# Patient Record
Sex: FEMALE | Race: WHITE | Employment: OTHER | URBAN - METROPOLITAN AREA
[De-identification: names, ages, dates, MRNs, and addresses within clinical notes are randomized per-mention and may not be internally consistent; named-entity substitution may affect disease eponyms.]

---

## 2019-08-27 ENCOUNTER — OFFICE VISIT (OUTPATIENT)
Dept: FAMILY MEDICINE CLINIC | Facility: CLINIC | Age: 84
End: 2019-08-27
Payer: MEDICARE

## 2019-08-27 VITALS
HEART RATE: 74 BPM | RESPIRATION RATE: 14 BRPM | WEIGHT: 130 LBS | SYSTOLIC BLOOD PRESSURE: 128 MMHG | DIASTOLIC BLOOD PRESSURE: 60 MMHG | HEIGHT: 65 IN | TEMPERATURE: 98 F | BODY MASS INDEX: 21.66 KG/M2

## 2019-08-27 DIAGNOSIS — I10 ESSENTIAL HYPERTENSION: Primary | ICD-10-CM

## 2019-08-27 DIAGNOSIS — M47.899 OTHER OSTEOARTHRITIS OF SPINE, UNSPECIFIED SPINAL REGION: ICD-10-CM

## 2019-08-27 PROBLEM — G62.9 NEUROPATHY: Status: ACTIVE | Noted: 2019-08-27

## 2019-08-27 PROBLEM — M19.90 ARTHRITIS: Status: ACTIVE | Noted: 2019-08-27

## 2019-08-27 PROBLEM — E78.2 MIXED HYPERLIPIDEMIA: Status: ACTIVE | Noted: 2019-08-27

## 2019-08-27 PROCEDURE — 99203 OFFICE O/P NEW LOW 30 MIN: CPT | Performed by: FAMILY MEDICINE

## 2019-08-27 RX ORDER — CYCLOBENZAPRINE HCL 5 MG
TABLET ORAL
Refills: 1 | COMMUNITY
Start: 2019-05-22 | End: 2019-08-27

## 2019-08-27 RX ORDER — HYDROCODONE BITARTRATE AND ACETAMINOPHEN 7.5; 325 MG/1; MG/1
TABLET ORAL
Refills: 0 | COMMUNITY
Start: 2019-08-02 | End: 2020-02-18

## 2019-08-27 RX ORDER — DOCUSATE SODIUM 100 MG/1
100 CAPSULE, LIQUID FILLED ORAL 2 TIMES DAILY PRN
COMMUNITY
End: 2019-09-23

## 2019-08-27 RX ORDER — SIMVASTATIN 40 MG
TABLET ORAL
Refills: 0 | COMMUNITY
Start: 2019-06-11 | End: 2019-09-23

## 2019-08-27 RX ORDER — LOSARTAN POTASSIUM 100 MG/1
TABLET ORAL
Refills: 0 | COMMUNITY
Start: 2019-06-09 | End: 2020-01-20

## 2019-08-27 RX ORDER — HYDROCODONE BITARTRATE AND ACETAMINOPHEN 7.5; 325 MG/1; MG/1
1 TABLET ORAL EVERY 8 HOURS PRN
Qty: 60 TABLET | Refills: 0 | Status: SHIPPED | OUTPATIENT
Start: 2019-08-27 | End: 2019-09-26

## 2019-08-27 RX ORDER — BUPRENORPHINE HCL 8 MG/1
1 TABLET SUBLINGUAL DAILY
COMMUNITY

## 2019-08-27 RX ORDER — HYDROCODONE BITARTRATE AND ACETAMINOPHEN 7.5; 325 MG/1; MG/1
1 TABLET ORAL EVERY 8 HOURS PRN
Qty: 60 TABLET | Refills: 0 | Status: SHIPPED | OUTPATIENT
Start: 2019-10-28 | End: 2019-11-27

## 2019-08-27 RX ORDER — HYDROCODONE BITARTRATE AND ACETAMINOPHEN 7.5; 325 MG/1; MG/1
1 TABLET ORAL EVERY 8 HOURS PRN
Qty: 60 TABLET | Refills: 0 | Status: SHIPPED | OUTPATIENT
Start: 2019-09-27 | End: 2019-10-27

## 2019-08-27 RX ORDER — GABAPENTIN 300 MG/1
1 CAPSULE ORAL 3 TIMES DAILY
COMMUNITY
Start: 2019-06-12 | End: 2020-01-20

## 2019-08-27 RX ORDER — CYCLOBENZAPRINE HCL 5 MG
TABLET ORAL
Qty: 60 TABLET | Refills: 2 | Status: SHIPPED | OUTPATIENT
Start: 2019-08-27 | End: 2020-01-17

## 2019-08-27 NOTE — H&P
[address]    History and Physical    Phuc Rivera Patient Status:  No patient class for patient encounter    1927 MRN [id number]   Location [facility] , [address] [place] [address] Attending No att.  prov Negative for fatigue and fever. HENT: Negative for sore throat and trouble swallowing. Respiratory: Negative for cough and shortness of breath. Cardiovascular: Negative for chest pain, palpitations and leg swelling.    Gastrointestinal: Negative for vIy Vazquez MD  8/27/2019

## 2019-08-27 NOTE — PATIENT INSTRUCTIONS
Thank you for choosing Saloni Ruff MD at [place] [address]  To Do: Vashti Rodriguez  1. Please take meds as directed. [facility] is located in [address]. Monday, Tuesday & Friday – 8 a.m. to 4 p.m. Wednesday, Thursday – 7 a.m. to 3 p.m. those potential risks and we strive to make you healthier and to improve your quality of life.     Referrals, and Radiology Information:    If your insurance requires a referral to a specialist, please allow 5 business days to process your referral request.

## 2019-09-19 NOTE — TELEPHONE ENCOUNTER
simvastatin 40 MG Oral Tab  docusate sodium 100 MG Oral Cap    Authorized Provider: External    Pt established care with PCP on 8/27/2019    [address], [place] - [id number] AT [address] [place] [address], [phone]

## 2019-09-23 RX ORDER — DOCUSATE SODIUM 100 MG/1
100 CAPSULE, LIQUID FILLED ORAL 2 TIMES DAILY PRN
Qty: 180 CAPSULE | Refills: 1 | Status: SHIPPED | OUTPATIENT
Start: 2019-09-23 | End: 2021-02-22

## 2019-09-23 RX ORDER — SIMVASTATIN 40 MG
TABLET ORAL
Qty: 30 TABLET | Refills: 0 | Status: SHIPPED | OUTPATIENT
Start: 2019-09-23 | End: 2019-09-23

## 2019-09-23 NOTE — TELEPHONE ENCOUNTER
Requested Medications      simvastatin 40 MG Oral Tab         Sig: TK 1 T PO QPM    Disp:  30 tablet    Refills:  0    Start: 9/23/2019    Class: Normal    Non-formulary    Last ordered: 3 weeks ago by Reported External/Patient     Cholesterol Medication P

## 2019-09-24 RX ORDER — DOCUSATE SODIUM 100 MG/1
CAPSULE, LIQUID FILLED ORAL
Qty: 180 CAPSULE | Refills: 0 | OUTPATIENT
Start: 2019-09-24

## 2019-09-24 RX ORDER — SIMVASTATIN 40 MG
TABLET ORAL
Qty: 90 TABLET | Refills: 0 | OUTPATIENT
Start: 2019-09-24

## 2019-09-24 RX ORDER — SIMVASTATIN 40 MG
TABLET ORAL
Qty: 90 TABLET | Refills: 0 | Status: SHIPPED | OUTPATIENT
Start: 2019-09-24 | End: 2019-12-23

## 2019-11-08 ENCOUNTER — TELEPHONE (OUTPATIENT)
Dept: FAMILY MEDICINE CLINIC | Facility: CLINIC | Age: 84
End: 2019-11-08

## 2019-11-08 NOTE — TELEPHONE ENCOUNTER
Pt will need refill on the [place]. If can be escribed, please send to the [place]. If not, patient's nephew will come into the office on Wednesday or Thursday to  the prescriptions. Please give for 3 months if possible.  Nephew states that he has Po

## 2019-11-08 NOTE — TELEPHONE ENCOUNTER
Spoke to pt's nephew per HIPAA consent, states that he just filled pt's October script yesterday for Derotha Salaam but will need the next 3 months of refills. Last OV 8/27/19    Pt's nephew states no rush, this can be done on Monday.  Either sent electronically

## 2019-11-09 RX ORDER — HYDROCODONE BITARTRATE AND ACETAMINOPHEN 7.5; 325 MG/1; MG/1
1 TABLET ORAL EVERY 8 HOURS PRN
Qty: 60 TABLET | Refills: 0 | Status: SHIPPED | OUTPATIENT
Start: 2020-01-06 | End: 2020-02-05

## 2019-11-09 RX ORDER — HYDROCODONE BITARTRATE AND ACETAMINOPHEN 7.5; 325 MG/1; MG/1
1 TABLET ORAL EVERY 8 HOURS PRN
Qty: 60 TABLET | Refills: 0 | Status: SHIPPED | OUTPATIENT
Start: 2020-02-05 | End: 2020-02-24

## 2019-11-09 RX ORDER — HYDROCODONE BITARTRATE AND ACETAMINOPHEN 7.5; 325 MG/1; MG/1
1 TABLET ORAL EVERY 8 HOURS PRN
Qty: 60 TABLET | Refills: 0 | Status: SHIPPED | OUTPATIENT
Start: 2019-12-07 | End: 2020-01-06

## 2019-11-13 NOTE — TELEPHONE ENCOUNTER
Patient's POA states he will be here this afternoon to  paper RX's for the next 3 months. Please advise.

## 2019-11-13 NOTE — TELEPHONE ENCOUNTER
Spoke with Chana Arora, advised that [employer] were sent electronically. Chana Arora verbalized understanding and agreement.

## 2019-12-23 RX ORDER — SIMVASTATIN 40 MG
TABLET ORAL
Qty: 90 TABLET | Refills: 0 | Status: SHIPPED | OUTPATIENT
Start: 2019-12-23 | End: 2020-01-20

## 2019-12-23 NOTE — TELEPHONE ENCOUNTER
Cholesterol Medication Protocol Kofyio42/20 10:53 AM   ALT < 80    ALT resulted within past year    Lipid panel within past 12 months    Appointment within past 12 or next 3 months     Requesting SIMVASTATIN 40 MG   LOV: 8/27/19  RTC: 6 months  Last Releva

## 2020-01-17 ENCOUNTER — TELEPHONE (OUTPATIENT)
Dept: FAMILY MEDICINE CLINIC | Facility: CLINIC | Age: 85
End: 2020-01-17

## 2020-01-17 RX ORDER — CYCLOBENZAPRINE HCL 5 MG
TABLET ORAL
Qty: 60 TABLET | Refills: 2 | Status: SHIPPED | OUTPATIENT
Start: 2020-01-17 | End: 2020-05-28

## 2020-01-17 NOTE — TELEPHONE ENCOUNTER
Requested Prescriptions     Pending Prescriptions Disp Refills   • cyclobenzaprine 5 MG Oral Tab [Pharmacy Med Name: CYCLOBENZAPRINE 5MG TABLETS] 60 tablet 2     Sig: TAKE 1 TABLET BY MOUTH ONCE TO TWICE DAILY     Non protocol medication    LOV: 8/27/2019

## 2020-01-17 NOTE — TELEPHONE ENCOUNTER
Patient requesting refill on Hydrocodone 7.5, please send refill to Loni Valadez in [facility].

## 2020-01-17 NOTE — TELEPHONE ENCOUNTER
Pt requesting refill on Norco 7.5/325mg    Last OV 8/27/19    3 months refill on 11/8/19    Per [place] , last dispensed 12/10/19 #60    Called [facility] to confirm whether or not pt has refills on file.  Spoke with pharmacy, confirmed pt has 2 scripts on iris

## 2020-01-20 ENCOUNTER — TELEPHONE (OUTPATIENT)
Dept: FAMILY MEDICINE CLINIC | Facility: CLINIC | Age: 85
End: 2020-01-20

## 2020-01-20 RX ORDER — SIMVASTATIN 40 MG
40 TABLET ORAL NIGHTLY
Qty: 90 TABLET | Refills: 0 | Status: SHIPPED | OUTPATIENT
Start: 2020-01-20 | End: 2020-03-30

## 2020-01-20 RX ORDER — LOSARTAN POTASSIUM 100 MG/1
TABLET ORAL
Qty: 90 TABLET | Refills: 0 | Status: SHIPPED | OUTPATIENT
Start: 2020-01-20 | End: 2020-04-21

## 2020-01-20 RX ORDER — GABAPENTIN 300 MG/1
300 CAPSULE ORAL 3 TIMES DAILY
Qty: 270 CAPSULE | Refills: 0 | Status: SHIPPED | OUTPATIENT
Start: 2020-01-20 | End: 2020-02-18

## 2020-01-20 NOTE — TELEPHONE ENCOUNTER
Received a fax from [facility], [phone] requesting [place] refill. Spoke to pharmacist, pt just picked up the 1/6/2020 RX yesterday and still have the 2/5/2020 on file. No refill needed at this time. Task completed.

## 2020-01-20 NOTE — TELEPHONE ENCOUNTER
Dr.Tomacruz-Thomas Shaw is calling to get Rx sent to a new pharmacy as it is closer for them.       [address] [phone], [phone]    losartan 100 MG Oral Tab  0 6/9/2019     Sig: TK 1 T PO D

## 2020-01-21 ENCOUNTER — TELEPHONE (OUTPATIENT)
Dept: FAMILY MEDICINE CLINIC | Facility: CLINIC | Age: 85
End: 2020-01-21

## 2020-01-21 NOTE — TELEPHONE ENCOUNTER
Spoke to pt's nephew, states that her insurance doesn't cover medications at [facility] so pt needs to continue to use [facility].  States that he called [facility] today and all of her prescriptions transferred back to [place], he just wanted to let office kn

## 2020-01-21 NOTE — TELEPHONE ENCOUNTER
Patient's nephew states the 3 refills from yesterday need to go back to [place], NOT [place], please advise.

## 2020-01-30 ENCOUNTER — TELEPHONE (OUTPATIENT)
Dept: FAMILY MEDICINE CLINIC | Facility: CLINIC | Age: 85
End: 2020-01-30

## 2020-01-30 NOTE — TELEPHONE ENCOUNTER
Verbal order provided to Home health for skilled nursing evaluation for heel sore.  They will fax order for MD to review and sign

## 2020-01-30 NOTE — TELEPHONE ENCOUNTER
Norma Hubbard is calling to give a condition update. Pt has a pressure sore on R heal.  The sore is draining.   Recommending Skilled nurse visit to evail R Heal.    Please advise [phone]  Need verbal.    Fax [phone]

## 2020-02-18 ENCOUNTER — OFFICE VISIT (OUTPATIENT)
Dept: FAMILY MEDICINE CLINIC | Facility: CLINIC | Age: 85
End: 2020-02-18
Payer: MEDICARE

## 2020-02-18 ENCOUNTER — APPOINTMENT (OUTPATIENT)
Dept: LAB | Age: 85
End: 2020-02-18
Attending: FAMILY MEDICINE
Payer: MEDICARE

## 2020-02-18 VITALS
SYSTOLIC BLOOD PRESSURE: 122 MMHG | BODY MASS INDEX: 21.66 KG/M2 | HEART RATE: 54 BPM | TEMPERATURE: 98 F | HEIGHT: 65 IN | OXYGEN SATURATION: 99 % | DIASTOLIC BLOOD PRESSURE: 60 MMHG | WEIGHT: 130 LBS | RESPIRATION RATE: 14 BRPM

## 2020-02-18 DIAGNOSIS — E78.2 MIXED HYPERLIPIDEMIA: ICD-10-CM

## 2020-02-18 DIAGNOSIS — Z00.00 ENCOUNTER FOR MEDICARE ANNUAL WELLNESS EXAM: Primary | ICD-10-CM

## 2020-02-18 DIAGNOSIS — Z00.00 ENCOUNTER FOR ANNUAL HEALTH EXAMINATION: ICD-10-CM

## 2020-02-18 DIAGNOSIS — I10 ESSENTIAL HYPERTENSION: ICD-10-CM

## 2020-02-18 LAB
ALBUMIN SERPL-MCNC: 3.6 G/DL (ref 3.4–5)
ALBUMIN/GLOB SERPL: 0.9 {RATIO} (ref 1–2)
ALP LIVER SERPL-CCNC: 82 U/L (ref 55–142)
ALT SERPL-CCNC: 39 U/L (ref 13–56)
ANION GAP SERPL CALC-SCNC: 5 MMOL/L (ref 0–18)
AST SERPL-CCNC: 31 U/L (ref 15–37)
BASOPHILS # BLD AUTO: 0.06 X10(3) UL (ref 0–0.2)
BASOPHILS NFR BLD AUTO: 1.2 %
BILIRUB SERPL-MCNC: 0.4 MG/DL (ref 0.1–2)
BUN BLD-MCNC: 26 MG/DL (ref 7–18)
BUN/CREAT SERPL: 23.9 (ref 10–20)
CALCIUM BLD-MCNC: 8.5 MG/DL (ref 8.5–10.1)
CHLORIDE SERPL-SCNC: 105 MMOL/L (ref 98–112)
CHOLEST SMN-MCNC: 117 MG/DL (ref ?–200)
CO2 SERPL-SCNC: 29 MMOL/L (ref 21–32)
CREAT BLD-MCNC: 1.09 MG/DL (ref 0.55–1.02)
DEPRECATED RDW RBC AUTO: 48.1 FL (ref 35.1–46.3)
EOSINOPHIL # BLD AUTO: 0.19 X10(3) UL (ref 0–0.7)
EOSINOPHIL NFR BLD AUTO: 3.7 %
ERYTHROCYTE [DISTWIDTH] IN BLOOD BY AUTOMATED COUNT: 13.8 % (ref 11–15)
GLOBULIN PLAS-MCNC: 4 G/DL (ref 2.8–4.4)
GLUCOSE BLD-MCNC: 87 MG/DL (ref 70–99)
HCT VFR BLD AUTO: 33.7 % (ref 35–48)
HDLC SERPL-MCNC: 50 MG/DL (ref 40–59)
HGB BLD-MCNC: 10.7 G/DL (ref 12–16)
IMM GRANULOCYTES # BLD AUTO: 0.02 X10(3) UL (ref 0–1)
IMM GRANULOCYTES NFR BLD: 0.4 %
LDLC SERPL CALC-MCNC: 51 MG/DL (ref ?–100)
LYMPHOCYTES # BLD AUTO: 1.12 X10(3) UL (ref 1–4)
LYMPHOCYTES NFR BLD AUTO: 21.8 %
M PROTEIN MFR SERPL ELPH: 7.6 G/DL (ref 6.4–8.2)
MCH RBC QN AUTO: 30.5 PG (ref 26–34)
MCHC RBC AUTO-ENTMCNC: 31.8 G/DL (ref 31–37)
MCV RBC AUTO: 96 FL (ref 80–100)
MONOCYTES # BLD AUTO: 0.46 X10(3) UL (ref 0.1–1)
MONOCYTES NFR BLD AUTO: 9 %
NEUTROPHILS # BLD AUTO: 3.28 X10 (3) UL (ref 1.5–7.7)
NEUTROPHILS # BLD AUTO: 3.28 X10(3) UL (ref 1.5–7.7)
NEUTROPHILS NFR BLD AUTO: 63.9 %
NONHDLC SERPL-MCNC: 67 MG/DL (ref ?–130)
OSMOLALITY SERPL CALC.SUM OF ELEC: 292 MOSM/KG (ref 275–295)
PATIENT FASTING Y/N/NP: NO
PATIENT FASTING Y/N/NP: NO
PLATELET # BLD AUTO: 255 10(3)UL (ref 150–450)
POTASSIUM SERPL-SCNC: 4.8 MMOL/L (ref 3.5–5.1)
RBC # BLD AUTO: 3.51 X10(6)UL (ref 3.8–5.3)
SODIUM SERPL-SCNC: 139 MMOL/L (ref 136–145)
TRIGL SERPL-MCNC: 81 MG/DL (ref 30–149)
VLDLC SERPL CALC-MCNC: 16 MG/DL (ref 0–30)
WBC # BLD AUTO: 5.1 X10(3) UL (ref 4–11)

## 2020-02-18 PROCEDURE — G0439 PPPS, SUBSEQ VISIT: HCPCS | Performed by: FAMILY MEDICINE

## 2020-02-18 RX ORDER — GABAPENTIN 300 MG/1
300 CAPSULE ORAL 3 TIMES DAILY
Qty: 270 CAPSULE | Refills: 1 | Status: SHIPPED | OUTPATIENT
Start: 2020-02-18 | End: 2020-10-05

## 2020-02-18 NOTE — PROGRESS NOTES
HPI:   Christina Masterson is a 80year old female who presents for a Medicare Subsequent Annual Wellness visit (Pt already had Initial Annual Wellness).     Ms. Leah Taylor is a pleasant 68-year-old female with history of hypertension, hyperlipidemia, arthritis, n weeks)?: Not at all  Feeling down, depressed, or hopeless (over the last two weeks)?: Not at all  PHQ-2 SCORE: 0     Advanced Directive: She has a Living Will on file in [address] [place] [address] Rd. She does have a POA but we do NOT have it on file in [address] [place] [address] Rd.    Not Discussed Heart Disorder in her father. SOCIAL HISTORY:   She  reports that she has never smoked. She has never used smokeless tobacco. She reports previous alcohol use. She reports that she does not use drugs.      REVIEW OF SYSTEMS:   Review of Systems   GENERAL: normal.        Vaccination History     Immunization History   Administered Date(s) Administered   • FLU VACC High Dose 65 YRS & Older PRSV Free (40535) 10/01/2019   • Fluzone Vaccine Medicare () 09/22/2015, 09/09/2016, 10/26/2017   • Influenza 09/18/2 results found for: GLUCOSE, GLU       Cardiovascular Disease Screening     LDL Annually No results found for: LDL, LDLC     EKG - w/ Initial Preventative Physical Exam only, or if medically necessary Electrocardiogram date     Colorectal Cancer Screening same house as a HepB virus carrier   Homosexual men   Illicit injectable drug abusers     Tetanus Toxoid  Only covered with a cut with metal- TD and TDaP Not covered by Medicare Part B) No vaccine history found This may be covered with your prescription be

## 2020-02-18 NOTE — PATIENT INSTRUCTIONS
Thank you for choosing Elham Rosales MD at [place] [address]  To Do: Danny Steele  1. Please see age appropriate health prevention below    [facility] is located in [address]. Monday, Tuesday & Friday – 8 a.m. to 4 p.m.   Wednesday, Thurs the benefits outweigh those potential risks and we strive to make you healthier and to improve your quality of life.     Referrals, and Radiology Information:    If your insurance requires a referral to a specialist, please allow 5 business days to process ages 36 to 76 who are overweight or obese At least every 3 years   Type 2 diabetes All women with prediabetes Every year   Alcohol misuse All women in this age group At routine exams   Blood pressure All women in this age group Yearly checkup if your blood Adults ages 54 to 76 who have smoked with a 30-pack-a-year history and have smoked within the past 13 years  Annual low dose CT scan   Obesity All women in this age group At routine exams   Osteoporosis All women in this age group Bone density test at age 1 dose of each vaccine   Tetanus/diphtheria/pertussis (Td/Tdap) booster All women in this age group Td every 10 years, or a one-time dose of Tdap instead of a Td booster after age 25, then Td every 10 years   Zoster All women in this age group 1 dose   Cou PREVENTATIVE SERVICES  INDICATIONS AND SCHEDULE Internal Lab or Procedure External Lab or Procedure   Diabetes Screening      HbgA1C    At Least  Annually for Diabetics No results found for: A1C No flowsheet data found.     Fasting Blood Sugar (FSB)   Kristie Lima covered  Covered but uncomfortable   Glaucoma Screening      Ophthalmology Visit   Covered annually for Diabetics, people with Glaucoma family history,   Americans over age 48   Americans over age 72 No flowsheet data found.  OK to schedule i mentally retarded   Persons who live in the same house as a HepB virus carrier   Homosexual men   Illicit injectable drug abusers     Tetanus Toxoid- Only covered with a cut with metal- TD and TDaP Not covered by Medicare Part B) No orders found for this o

## 2020-02-21 ENCOUNTER — TELEPHONE (OUTPATIENT)
Dept: FAMILY MEDICINE CLINIC | Facility: CLINIC | Age: 85
End: 2020-02-21

## 2020-02-21 RX ORDER — BENZONATATE 200 MG/1
200 CAPSULE ORAL 3 TIMES DAILY PRN
Qty: 15 CAPSULE | Refills: 0 | Status: SHIPPED | OUTPATIENT
Start: 2020-02-21 | End: 2020-08-18 | Stop reason: ALTCHOICE

## 2020-02-21 NOTE — TELEPHONE ENCOUNTER
Dr.Tomacruz- Juarez was in the office on Tuesday for appointment and has developed a cough. Can patient get a cough syrup to help with the cough.     [facility] is also calling to follow up on scripts for     HYDROcodone-acetaminophen 7.5-325 MG Oral Tab 60 tablet 0

## 2020-02-21 NOTE — TELEPHONE ENCOUNTER
1. Cough medication. gurlgling cough for the past 2 days. No fever or chills. No nasal congestion. Patient only has cough. Would like cough medication as she is unable to sleep. Please advise     2. RX follow up. Gabapentin is ready for .  Per pharmac

## 2020-02-24 RX ORDER — HYDROCODONE BITARTRATE AND ACETAMINOPHEN 7.5; 325 MG/1; MG/1
1 TABLET ORAL EVERY 8 HOURS PRN
Qty: 60 TABLET | Refills: 0 | Status: SHIPPED | OUTPATIENT
Start: 2020-02-24 | End: 2020-03-25

## 2020-02-24 NOTE — TELEPHONE ENCOUNTER
Patients nephew informed of MD recommendations below, verbalized understanding with intent to comply. Offered opportunity to ask questions, all questions were answered. Would like Norco refilled as well.  Pended for approval  Future Appointments   Date Mauro Mancuso

## 2020-03-30 RX ORDER — SIMVASTATIN 40 MG
TABLET ORAL
Qty: 90 TABLET | Refills: 0 | Status: SHIPPED | OUTPATIENT
Start: 2020-03-30 | End: 2020-09-21

## 2020-03-30 NOTE — TELEPHONE ENCOUNTER
Name from pharmacy: SIMVASTATIN 40MG TABLETS          Will file in chart as: SIMVASTATIN 40 MG Oral Tab         Sig: TAKE 1 TABLET BY MOUTH EVERY EVENING    Disp:  90 tablet    Refills:  0 (Pharmacy requested: Not specified)    Start: 3/28/2020    Class: N

## 2020-04-02 RX ORDER — HYDROCODONE BITARTRATE AND ACETAMINOPHEN 7.5; 325 MG/1; MG/1
1 TABLET ORAL EVERY 8 HOURS PRN
Qty: 60 TABLET | Refills: 0 | Status: SHIPPED | OUTPATIENT
Start: 2020-04-02 | End: 2020-05-06

## 2020-04-02 NOTE — TELEPHONE ENCOUNTER
Requesting hydrocodone 10/325mg  LOV: 2/18/20  RTC:   Last Relevant Labs: 2/18/20  Filled: 2/24/20 #60 with 0 refills    Future Appointments   Date Time Provider Leno Quezada   8/18/2020  2:45 PM Florencia aCo MD [facility] [address] [facility] [address]     HYDROCODONE

## 2020-04-21 RX ORDER — LOSARTAN POTASSIUM 100 MG/1
100 TABLET ORAL DAILY
Qty: 90 TABLET | Refills: 1 | Status: SHIPPED | OUTPATIENT
Start: 2020-04-21 | End: 2020-10-28

## 2020-04-21 NOTE — TELEPHONE ENCOUNTER
Hypertension Medications Protocol Passed4/21 2:17 PM   CMP or BMP in past 12 months    Last serum creatinine< 2.0    Appointment in past 6 or next 3 months     Requesting  losartan 100 MG   LOV: 2/18/20  RTC: 6 months  Last Relevant Labs: 2/18/20  Filled:

## 2020-04-21 NOTE — TELEPHONE ENCOUNTER
Pt will need refill on the [facility]. This was originally prescribed by previous physician.

## 2020-05-06 RX ORDER — HYDROCODONE BITARTRATE AND ACETAMINOPHEN 7.5; 325 MG/1; MG/1
1 TABLET ORAL EVERY 8 HOURS PRN
Qty: 60 TABLET | Refills: 0 | Status: SHIPPED | OUTPATIENT
Start: 2020-05-06 | End: 2020-06-05

## 2020-05-06 NOTE — TELEPHONE ENCOUNTER
Requesting hydrocodone 7.5/325mg  LOV: 2/18/20  RTC: 6 months  Last Relevant Labs: 2/18/20  Filled: 4/2/20 #60 with 0 refills    Future Appointments   Date Time Provider Leno Quezada   8/18/2020  2:45 PM Sabrina Leon MD [facility] [address] [facility] [address]     HYD

## 2020-05-28 ENCOUNTER — TELEPHONE (OUTPATIENT)
Dept: FAMILY MEDICINE CLINIC | Facility: CLINIC | Age: 85
End: 2020-05-28

## 2020-05-28 ENCOUNTER — VIRTUAL PHONE E/M (OUTPATIENT)
Dept: FAMILY MEDICINE CLINIC | Facility: CLINIC | Age: 85
End: 2020-05-28
Payer: MEDICARE

## 2020-05-28 DIAGNOSIS — M47.899 OTHER OSTEOARTHRITIS OF SPINE, UNSPECIFIED SPINAL REGION: Primary | ICD-10-CM

## 2020-05-28 PROCEDURE — 99441 PHONE E/M BY PHYS 5-10 MIN: CPT | Performed by: FAMILY MEDICINE

## 2020-05-28 RX ORDER — CYCLOBENZAPRINE HCL 5 MG
TABLET ORAL
Qty: 60 TABLET | Refills: 2 | Status: SHIPPED | OUTPATIENT
Start: 2020-05-28 | End: 2020-08-04

## 2020-05-28 NOTE — TELEPHONE ENCOUNTER
Future Appointments   Date Time Provider Leno Pilar   5/28/2020  3:00 PM Sherwin Camejo MD [facility] [address] [facility] [address]   8/18/2020  2:45 PM Sherwin Camejo MD [facility] [address] [facility] [address]     Patient verbally consents to a virtual/telephone check-in service for t

## 2020-05-28 NOTE — PROGRESS NOTES
HPI:    Patient ID: Harriet Padron is a 80year old female. HPI     Ms. Diallo Coburn is a pleasant 25-year-old female with history of hypertension, hyperlipidemia, arthritis, neuropathy presenting for a phone visit. I had spoken to his nephew.   She will be (primary encounter diagnosis)  -stable; cyclobenzaprine was refilled and sent to her pharmacy; continue with other medications as point and follow-up as scheduled or as needed. No orders of the defined types were placed in this encounter.       Meds This V

## 2020-05-28 NOTE — PROGRESS NOTES
Virtual Telephone Check-In    Marii Luna verbally consents to a Virtual/Telephone Check-In visit on 05/28/20. Patient has been referred to the [facility] website at [email or web address] to review the yearly Consent to Treat document.     Patient underst

## 2020-05-28 NOTE — PATIENT INSTRUCTIONS
Thank you for choosing Anu Carlos MD at [place] [address]  To Do: Cristopher Vu  1. Please take meds as directed. [facility] is located in [address]. Monday, Tuesday & Friday – 8 a.m. to 4 p.m.   Wednesday, Thursday – 7 a.m. to 3 p.m those potential risks and we strive to make you healthier and to improve your quality of life.     Referrals, and Radiology Information:    If your insurance requires a referral to a specialist, please allow 5 business days to process your referral request.

## 2020-06-02 RX ORDER — HYDROCODONE BITARTRATE AND ACETAMINOPHEN 7.5; 325 MG/1; MG/1
1 TABLET ORAL EVERY 8 HOURS PRN
Qty: 60 TABLET | Refills: 0 | Status: SHIPPED | OUTPATIENT
Start: 2020-06-02 | End: 2020-09-08

## 2020-06-02 NOTE — TELEPHONE ENCOUNTER
Requesting Norco 7.5/325mg  LOV: 5/28/2020 for Osteoarthritis  RTC: prn  Last Relevant Labs: 2/18/2020  Filled: 5/6/2020 #60 with 0 refills    Future Appointments   Date Time Provider Leno Quezada   8/18/2020  2:45 PM Juan Hoffman MD [facility]

## 2020-07-08 RX ORDER — HYDROCODONE BITARTRATE AND ACETAMINOPHEN 7.5; 325 MG/1; MG/1
1 TABLET ORAL EVERY 8 HOURS PRN
Qty: 60 TABLET | Refills: 0 | Status: SHIPPED | OUTPATIENT
Start: 2020-07-08 | End: 2020-08-04

## 2020-07-08 NOTE — TELEPHONE ENCOUNTER
Requesting Norco 7.5/325mg  LOV: 5/28/2020  RTC: prn  Last Relevant Labs: 2/18/2020  Filled: 6/2/2020 #60 with 0 refills    Future Appointments   Date Time Provider Leno Quezada   8/18/2020  2:45 PM Alisa Strickland MD [facility] [address] [facility] [address]     Per [place] P

## 2020-07-23 ENCOUNTER — TELEPHONE (OUTPATIENT)
Dept: FAMILY MEDICINE CLINIC | Facility: CLINIC | Age: 85
End: 2020-07-23

## 2020-07-23 DIAGNOSIS — K59.00 CONSTIPATION, UNSPECIFIED CONSTIPATION TYPE: Primary | ICD-10-CM

## 2020-07-23 NOTE — TELEPHONE ENCOUNTER
Called Mynor Linda and gave him a referral to FAIZA Shafer, for pt. Mynor Linda stated he would call the office to make an appointment.

## 2020-07-23 NOTE — TELEPHONE ENCOUNTER
Patient's nephew calling, will like a recommendation on GI specialist. [address] [place]. Patient with issues with constipation and recent vomiting.  Please advise

## 2020-08-04 RX ORDER — HYDROCODONE BITARTRATE AND ACETAMINOPHEN 7.5; 325 MG/1; MG/1
1 TABLET ORAL EVERY 8 HOURS PRN
Qty: 60 TABLET | Refills: 0 | Status: SHIPPED | OUTPATIENT
Start: 2020-08-08 | End: 2020-08-18

## 2020-08-04 RX ORDER — CYCLOBENZAPRINE HCL 5 MG
TABLET ORAL
Qty: 60 TABLET | Refills: 2 | Status: SHIPPED | OUTPATIENT
Start: 2020-08-04 | End: 2020-10-05

## 2020-08-04 NOTE — TELEPHONE ENCOUNTER
Requesting Norco  LOV: 5/28/2020  RTC: prn  Last Relevant Labs: 2/18/2020  Filled: 7/8/2020 #60 with 0 refills    Requesting Cyclobenzaprine  LOV: 5/28/2020  RTC: prn  Last Relevant Labs: 2/18/2020  Filled: 5/28/2020 #60 with 2 refills    Future Appointmen

## 2020-08-04 NOTE — TELEPHONE ENCOUNTER
Patient's nephew calling to get refill started for patient.  Stated refills not due until 8-10 but wanted to put in request. Asking for Hydrocodone, Cyclobenzaprine, asking for refills to be sent to [place] on file

## 2020-08-18 ENCOUNTER — OFFICE VISIT (OUTPATIENT)
Dept: FAMILY MEDICINE CLINIC | Facility: CLINIC | Age: 85
End: 2020-08-18
Payer: MEDICARE

## 2020-08-18 VITALS
OXYGEN SATURATION: 96 % | HEIGHT: 65 IN | DIASTOLIC BLOOD PRESSURE: 70 MMHG | RESPIRATION RATE: 14 BRPM | WEIGHT: 124.13 LBS | HEART RATE: 86 BPM | SYSTOLIC BLOOD PRESSURE: 124 MMHG | TEMPERATURE: 97 F | BODY MASS INDEX: 20.68 KG/M2

## 2020-08-18 DIAGNOSIS — R11.0 NAUSEA: ICD-10-CM

## 2020-08-18 DIAGNOSIS — R53.81 PHYSICAL DECONDITIONING: ICD-10-CM

## 2020-08-18 DIAGNOSIS — K21.9 GASTROESOPHAGEAL REFLUX DISEASE, ESOPHAGITIS PRESENCE NOT SPECIFIED: Primary | ICD-10-CM

## 2020-08-18 PROCEDURE — 99214 OFFICE O/P EST MOD 30 MIN: CPT | Performed by: FAMILY MEDICINE

## 2020-08-18 RX ORDER — PANTOPRAZOLE SODIUM 40 MG/1
40 TABLET, DELAYED RELEASE ORAL
Qty: 90 TABLET | Refills: 1 | Status: SHIPPED | OUTPATIENT
Start: 2020-08-18 | End: 2020-10-05

## 2020-08-18 RX ORDER — PANTOPRAZOLE SODIUM 40 MG/1
40 TABLET, DELAYED RELEASE ORAL
COMMUNITY
End: 2020-08-18

## 2020-08-18 RX ORDER — ONDANSETRON 4 MG/1
4 TABLET, FILM COATED ORAL EVERY 8 HOURS PRN
COMMUNITY
Start: 2020-08-04 | End: 2020-08-18

## 2020-08-18 RX ORDER — ONDANSETRON 4 MG/1
4 TABLET, FILM COATED ORAL EVERY 8 HOURS PRN
Qty: 30 TABLET | Refills: 1 | Status: SHIPPED | OUTPATIENT
Start: 2020-08-18

## 2020-08-18 NOTE — PROGRESS NOTES
HPI:    Patient ID: Tran Mantilla is a 80year old female. HPI  Ms. Channing Rose is a pleasant 44-year-old female with history of hypertension, hyperlipidemia, arthritis, neuropathy who is wheelchair-bound and accompanied by his nephew after she was seen a 0   • losartan 100 MG Oral Tab Take 1 tablet (100 mg total) by mouth daily.  TK 1 T PO D 90 tablet 1   • SIMVASTATIN 40 MG Oral Tab TAKE 1 TABLET BY MOUTH EVERY EVENING 90 tablet 0   • gabapentin 300 MG Oral Cap Take 1 capsule (300 mg total) by mouth 3 (thr orders of the defined types were placed in this encounter.       Meds This Visit:  Requested Prescriptions     Signed Prescriptions Disp Refills   • Ondansetron HCl (ZOFRAN) 4 mg tablet 30 tablet 1     Sig: Take 1 tablet (4 mg total) by mouth every 8 (eight

## 2020-08-18 NOTE — PATIENT INSTRUCTIONS
Thank you for choosing Elham Rosales MD at [place] [address]  To Do: Danny Steele  1. Please take meds as directed. [facility] is located in [address]. Monday, Tuesday & Friday – 8 a.m. to 4 p.m.   Wednesday, Thursday – 7 a.m. to 3 p.m those potential risks and we strive to make you healthier and to improve your quality of life.     Referrals, and Radiology Information:    If your insurance requires a referral to a specialist, please allow 5 business days to process your referral request.

## 2020-08-19 ENCOUNTER — TELEPHONE (OUTPATIENT)
Dept: FAMILY MEDICINE CLINIC | Facility: CLINIC | Age: 85
End: 2020-08-19

## 2020-08-19 DIAGNOSIS — R53.81 PHYSICAL DECONDITIONING: Primary | ICD-10-CM

## 2020-08-19 NOTE — TELEPHONE ENCOUNTER
Calling because [facility] is outside of their area. Patient has already been referred to [facility].

## 2020-08-26 ENCOUNTER — MED REC SCAN ONLY (OUTPATIENT)
Dept: FAMILY MEDICINE CLINIC | Facility: CLINIC | Age: 85
End: 2020-08-26

## 2020-09-08 RX ORDER — HYDROCODONE BITARTRATE AND ACETAMINOPHEN 7.5; 325 MG/1; MG/1
1 TABLET ORAL EVERY 8 HOURS PRN
Qty: 60 TABLET | Refills: 0 | Status: SHIPPED | OUTPATIENT
Start: 2020-09-08 | End: 2020-10-05

## 2020-09-21 RX ORDER — SIMVASTATIN 40 MG
TABLET ORAL
Qty: 90 TABLET | Refills: 0 | Status: SHIPPED | OUTPATIENT
Start: 2020-09-21 | End: 2020-12-18

## 2020-09-21 NOTE — TELEPHONE ENCOUNTER
SIMVASTATIN 40MG TABLETS          Will file in chart as: SIMVASTATIN 40 MG Oral Tab    Sig: TAKE 1 TABLET BY MOUTH EVERY EVENING    Disp:  90 tablet    Refills:  0 (Pharmacy requested: Not specified)    Start: 9/21/2020    Class: Normal    Non-formulary

## 2020-10-05 ENCOUNTER — TELEPHONE (OUTPATIENT)
Dept: FAMILY MEDICINE CLINIC | Facility: CLINIC | Age: 85
End: 2020-10-05

## 2020-10-05 RX ORDER — HYDROCODONE BITARTRATE AND ACETAMINOPHEN 7.5; 325 MG/1; MG/1
1 TABLET ORAL EVERY 8 HOURS PRN
Qty: 60 TABLET | Refills: 0 | Status: SHIPPED | OUTPATIENT
Start: 2020-10-05 | End: 2020-11-09

## 2020-10-05 RX ORDER — GABAPENTIN 300 MG/1
CAPSULE ORAL
Qty: 270 CAPSULE | Refills: 1 | Status: SHIPPED | OUTPATIENT
Start: 2020-10-05 | End: 2021-05-10

## 2020-10-05 RX ORDER — CYCLOBENZAPRINE HCL 5 MG
TABLET ORAL
Qty: 60 TABLET | Refills: 2 | Status: SHIPPED | OUTPATIENT
Start: 2020-10-05 | End: 2020-10-28

## 2020-10-05 RX ORDER — PANTOPRAZOLE SODIUM 40 MG/1
40 TABLET, DELAYED RELEASE ORAL
Qty: 90 TABLET | Refills: 1 | Status: SHIPPED | OUTPATIENT
Start: 2020-10-05

## 2020-10-05 NOTE — TELEPHONE ENCOUNTER
Requesting GABAPENTIN 300 MG, : CYCLOBENZAPRINE 5 MG   LOV: 8/18/20  RTC:   Last Relevant Labs: 2/18/20      No future appointments. GABAPENTIN 300MG CAPSULES 05/19/2020 02/18/2020  270 each  [address] ..      CYC

## 2020-10-05 NOTE — TELEPHONE ENCOUNTER
Called Misa Ortega to inform that Dr. Keila Vizcaino agrees to extend home health, verbalizes understanding.

## 2020-10-05 NOTE — TELEPHONE ENCOUNTER
Requesting Norco 7.5/325mg  LOV: 8/18/2020  RTC: 6 months  Last Relevant Labs: 2/18/2020  Filled: 9/8/2020 #60 with 0 refills    Requesting Pantoprazole 40mg  LOV: 8/18/2020  RTC: 6 months  Last Relevant Labs: 2/18/2020  Filled: 8/18/2020 #90 with 1 refill

## 2020-10-14 ENCOUNTER — TELEPHONE (OUTPATIENT)
Dept: FAMILY MEDICINE CLINIC | Facility: CLINIC | Age: 85
End: 2020-10-14

## 2020-10-14 NOTE — TELEPHONE ENCOUNTER
Spoke to [place] with [facility], advised that it doesn't look like we received fax. She will refax orders.

## 2020-10-28 RX ORDER — CYCLOBENZAPRINE HCL 5 MG
5 TABLET ORAL 2 TIMES DAILY
Qty: 60 TABLET | Refills: 0 | Status: SHIPPED | OUTPATIENT
Start: 2020-10-28 | End: 2020-12-03

## 2020-10-28 RX ORDER — LOSARTAN POTASSIUM 100 MG/1
100 TABLET ORAL DAILY
Qty: 90 TABLET | Refills: 0 | Status: SHIPPED | OUTPATIENT
Start: 2020-10-28 | End: 2021-01-26

## 2020-10-28 NOTE — TELEPHONE ENCOUNTER
Yanni An- Pt's Angelita [place] is calling as Pharmacy is not able to fill. Pt would like 3 month supply. BP emergency supply has been given for 3 days.     [facility] [address] AT [facility]

## 2020-11-09 RX ORDER — HYDROCODONE BITARTRATE AND ACETAMINOPHEN 7.5; 325 MG/1; MG/1
1 TABLET ORAL EVERY 8 HOURS PRN
Qty: 60 TABLET | Refills: 0 | Status: SHIPPED | OUTPATIENT
Start: 2020-11-09 | End: 2020-12-09

## 2020-11-09 NOTE — TELEPHONE ENCOUNTER
Patient's nephew calling, asking for refill on [place] for patient. Patient stated he called earlier about pain meds, message was supposed to be for his Aunt-patient. Please send to [place] on file.

## 2020-12-03 RX ORDER — CYCLOBENZAPRINE HCL 5 MG
5 TABLET ORAL 2 TIMES DAILY
Qty: 60 TABLET | Refills: 0 | Status: SHIPPED | OUTPATIENT
Start: 2020-12-03 | End: 2021-01-13

## 2020-12-03 NOTE — TELEPHONE ENCOUNTER
Requesting Cyclobenzaprine 5mg  LOV: 8/18/2020  RTC: 6 months  Last Relevant Labs: 2/18/2020  Filled: 10/28/2020 #60 with 0 refills    Future Appointments   Date Time Provider Leno Quezada   2/19/2021 11:00 AM Shanna Goins MD [facility] [address] [facility] [address]

## 2020-12-09 RX ORDER — HYDROCODONE BITARTRATE AND ACETAMINOPHEN 7.5; 325 MG/1; MG/1
1 TABLET ORAL EVERY 8 HOURS PRN
Qty: 60 TABLET | Refills: 0 | Status: SHIPPED | OUTPATIENT
Start: 2020-12-09 | End: 2021-01-12

## 2020-12-09 NOTE — TELEPHONE ENCOUNTER
Disp Refills Start End    HYDROcodone-acetaminophen (NORCO) 7.5-325 MG Oral Tab 60 tablet 0 11/9/2020       Last OV 8/18/20 for GERD  Future Appointments   Date Time Provider Leno Quezada   2/19/2021 11:00 AM Nafisa Ruelas MD [facility] [address] [facility] [address]

## 2020-12-10 ENCOUNTER — TELEPHONE (OUTPATIENT)
Dept: FAMILY MEDICINE CLINIC | Facility: CLINIC | Age: 85
End: 2020-12-10

## 2020-12-10 DIAGNOSIS — R53.81 PHYSICAL DECONDITIONING: Primary | ICD-10-CM

## 2020-12-10 NOTE — TELEPHONE ENCOUNTER
Patient's nephew states they need 9 more weeks to re-cert [facility], the previous one is . Please fax Heidi Betts @[phone] attn: Brendon Ruvalcaba

## 2020-12-18 RX ORDER — SIMVASTATIN 40 MG
TABLET ORAL
Qty: 90 TABLET | Refills: 0 | Status: SHIPPED | OUTPATIENT
Start: 2020-12-18 | End: 2021-04-12

## 2020-12-18 NOTE — TELEPHONE ENCOUNTER
Name from pharmacy: SIMVASTATIN 40MG TABLETS         Will file in chart as: SIMVASTATIN 40 MG Oral Tab    Sig: TAKE 1 TABLET BY MOUTH EVERY EVENING    Disp:  90 tablet    Refills:  0 (Pharmacy requested: Not specified)    Start: 12/18/2020    Class: Domo [place]

## 2021-01-12 RX ORDER — HYDROCODONE BITARTRATE AND ACETAMINOPHEN 7.5; 325 MG/1; MG/1
1 TABLET ORAL EVERY 8 HOURS PRN
Qty: 60 TABLET | Refills: 0 | Status: SHIPPED | OUTPATIENT
Start: 2021-01-12 | End: 2021-02-08

## 2021-01-12 NOTE — TELEPHONE ENCOUNTER
Requesting Norco 7.5/325mg  LOV: 8/18/2020  RTC: 6 months  Last Relevant Labs: 2/18/2020  Filled: 12/9/2020 #60 with 0 refills    Future Appointments   Date Time Provider Leno Quezada   2/19/2021 11:00 AM Alisa Dakin, MD [facility] [address] [facility] [address]     [place]

## 2021-01-13 RX ORDER — CYCLOBENZAPRINE HCL 5 MG
TABLET ORAL
Qty: 60 TABLET | Refills: 5 | Status: SHIPPED | OUTPATIENT
Start: 2021-01-13 | End: 2021-08-02

## 2021-01-13 RX ORDER — CYCLOBENZAPRINE HCL 5 MG
TABLET ORAL
Qty: 60 TABLET | Refills: 0 | Status: CANCELLED | OUTPATIENT
Start: 2021-01-13

## 2021-01-13 NOTE — TELEPHONE ENCOUNTER
Requesting cyclobenzaprine 5 MG Oral Tab  LOV: 8/18/20  RTC:   Last Relevant Labs: 2/18/20  Filled: 12/3/20 # 60 with 0 refills    Future Appointments   Date Time Provider Leno Quezada   2/19/2021 11:00 AM Erika Marr MD [facility] [address] [facility] [address]

## 2021-01-26 RX ORDER — LOSARTAN POTASSIUM 100 MG/1
100 TABLET ORAL DAILY
Qty: 90 TABLET | Refills: 1 | Status: SHIPPED | OUTPATIENT
Start: 2021-01-26 | End: 2021-07-28

## 2021-01-26 NOTE — TELEPHONE ENCOUNTER
Hypertension Medications Protocol Owoerk6301/26/2021 10:22 AM   CMP or BMP in past 12 months Protocol Details    Last serum creatinine< 2.0     Appointment in past 6 or next 3 months      Refill protocol passed because the patient met the following protocol

## 2021-01-26 NOTE — TELEPHONE ENCOUNTER
Pt is requesting a 3 month supply of her medication.     [facility] DR[facility] STORE [address] [place] [address], [phone], [phone]   Outpatient Medication Detail     Disp Refills Start

## 2021-02-08 RX ORDER — HYDROCODONE BITARTRATE AND ACETAMINOPHEN 7.5; 325 MG/1; MG/1
1 TABLET ORAL EVERY 8 HOURS PRN
Qty: 60 TABLET | Refills: 0 | Status: SHIPPED | OUTPATIENT
Start: 2021-02-08 | End: 2021-03-11

## 2021-02-22 ENCOUNTER — OFFICE VISIT (OUTPATIENT)
Dept: FAMILY MEDICINE CLINIC | Facility: CLINIC | Age: 86
End: 2021-02-22
Payer: MEDICARE

## 2021-02-22 ENCOUNTER — LAB ENCOUNTER (OUTPATIENT)
Dept: LAB | Age: 86
End: 2021-02-22
Attending: FAMILY MEDICINE
Payer: MEDICARE

## 2021-02-22 VITALS
BODY MASS INDEX: 19.66 KG/M2 | SYSTOLIC BLOOD PRESSURE: 112 MMHG | DIASTOLIC BLOOD PRESSURE: 70 MMHG | WEIGHT: 118 LBS | HEIGHT: 65 IN | HEART RATE: 64 BPM | RESPIRATION RATE: 16 BRPM | TEMPERATURE: 97 F | OXYGEN SATURATION: 94 %

## 2021-02-22 DIAGNOSIS — Z23 NEED FOR VACCINATION: Primary | ICD-10-CM

## 2021-02-22 DIAGNOSIS — Z00.00 ENCOUNTER FOR ANNUAL HEALTH EXAMINATION: ICD-10-CM

## 2021-02-22 DIAGNOSIS — Z00.00 ENCOUNTER FOR ANNUAL WELLNESS EXAM IN MEDICARE PATIENT: ICD-10-CM

## 2021-02-22 LAB
ALBUMIN SERPL-MCNC: 3.7 G/DL (ref 3.4–5)
ALBUMIN/GLOB SERPL: 1 {RATIO} (ref 1–2)
ALP LIVER SERPL-CCNC: 99 U/L
ALT SERPL-CCNC: 19 U/L
ANION GAP SERPL CALC-SCNC: 5 MMOL/L (ref 0–18)
AST SERPL-CCNC: 15 U/L (ref 15–37)
BASOPHILS # BLD AUTO: 0.03 X10(3) UL (ref 0–0.2)
BASOPHILS NFR BLD AUTO: 0.7 %
BILIRUB SERPL-MCNC: 0.4 MG/DL (ref 0.1–2)
BUN BLD-MCNC: 27 MG/DL (ref 7–18)
BUN/CREAT SERPL: 30 (ref 10–20)
CALCIUM BLD-MCNC: 8.9 MG/DL (ref 8.5–10.1)
CHLORIDE SERPL-SCNC: 108 MMOL/L (ref 98–112)
CHOLEST SMN-MCNC: 115 MG/DL (ref ?–200)
CO2 SERPL-SCNC: 29 MMOL/L (ref 21–32)
CREAT BLD-MCNC: 0.9 MG/DL
DEPRECATED RDW RBC AUTO: 48.2 FL (ref 35.1–46.3)
EOSINOPHIL # BLD AUTO: 0.18 X10(3) UL (ref 0–0.7)
EOSINOPHIL NFR BLD AUTO: 4.3 %
ERYTHROCYTE [DISTWIDTH] IN BLOOD BY AUTOMATED COUNT: 13.5 % (ref 11–15)
GLOBULIN PLAS-MCNC: 3.6 G/DL (ref 2.8–4.4)
GLUCOSE BLD-MCNC: 82 MG/DL (ref 70–99)
HCT VFR BLD AUTO: 31.8 %
HDLC SERPL-MCNC: 58 MG/DL (ref 40–59)
HGB BLD-MCNC: 10 G/DL
IMM GRANULOCYTES # BLD AUTO: 0.01 X10(3) UL (ref 0–1)
IMM GRANULOCYTES NFR BLD: 0.2 %
LDLC SERPL CALC-MCNC: 44 MG/DL (ref ?–100)
LYMPHOCYTES # BLD AUTO: 1.02 X10(3) UL (ref 1–4)
LYMPHOCYTES NFR BLD AUTO: 24.5 %
M PROTEIN MFR SERPL ELPH: 7.3 G/DL (ref 6.4–8.2)
MCH RBC QN AUTO: 30.4 PG (ref 26–34)
MCHC RBC AUTO-ENTMCNC: 31.4 G/DL (ref 31–37)
MCV RBC AUTO: 96.7 FL
MONOCYTES # BLD AUTO: 0.35 X10(3) UL (ref 0.1–1)
MONOCYTES NFR BLD AUTO: 8.4 %
NEUTROPHILS # BLD AUTO: 2.57 X10 (3) UL (ref 1.5–7.7)
NEUTROPHILS # BLD AUTO: 2.57 X10(3) UL (ref 1.5–7.7)
NEUTROPHILS NFR BLD AUTO: 61.9 %
NONHDLC SERPL-MCNC: 57 MG/DL (ref ?–130)
OSMOLALITY SERPL CALC.SUM OF ELEC: 298 MOSM/KG (ref 275–295)
PATIENT FASTING Y/N/NP: NO
PATIENT FASTING Y/N/NP: NO
PLATELET # BLD AUTO: 238 10(3)UL (ref 150–450)
POTASSIUM SERPL-SCNC: 5 MMOL/L (ref 3.5–5.1)
RBC # BLD AUTO: 3.29 X10(6)UL
SODIUM SERPL-SCNC: 142 MMOL/L (ref 136–145)
TRIGL SERPL-MCNC: 63 MG/DL (ref 30–149)
VLDLC SERPL CALC-MCNC: 13 MG/DL (ref 0–30)
WBC # BLD AUTO: 4.2 X10(3) UL (ref 4–11)

## 2021-02-22 PROCEDURE — G0439 PPPS, SUBSEQ VISIT: HCPCS | Performed by: FAMILY MEDICINE

## 2021-02-22 PROCEDURE — 36415 COLL VENOUS BLD VENIPUNCTURE: CPT | Performed by: FAMILY MEDICINE

## 2021-02-22 PROCEDURE — 80053 COMPREHEN METABOLIC PANEL: CPT | Performed by: FAMILY MEDICINE

## 2021-02-22 PROCEDURE — 85025 COMPLETE CBC W/AUTO DIFF WBC: CPT | Performed by: FAMILY MEDICINE

## 2021-02-22 PROCEDURE — 80061 LIPID PANEL: CPT | Performed by: FAMILY MEDICINE

## 2021-02-22 NOTE — PATIENT INSTRUCTIONS
Thank you for choosing Louisa Bowie MD at [place] [address]  To Do: Jimbo Avila  1. Please see age appropriate health prevention below    [facility] is located in [address]. Monday, Tuesday & Friday – 8 a.m. to 4 p.m.   Wednesday, Thurs the benefits outweigh those potential risks and we strive to make you healthier and to improve your quality of life.     Referrals, and Radiology Information:    If your insurance requires a referral to a specialist, please allow 5 business days to process beginning at age 39 and women without symptoms at any age who are overweight or obese and have 1 or more additional risk factors for diabetes At least every 3 years   Type 2 diabetes All women with prediabetes Every year   Unhealthy alcohol use All women i active women at increased risk for infection At yearly routine exams   Hepatitis C Anyone at increased risk; 1 time for those born between [facility] At routine exams   High cholesterol or triglycerides All women in this age group who are at risk for co (depending on the vaccine); second dose should be given 1 month after the first dose; if a the third dose, it should be given at least 2 months after the second dose and at least 4 months after the first dose   Haemophilus influenza type B (HIB) Women at  tobacco and the health effects it can cause All women in this age group Every exam   Mikey last reviewed this educational content on 7/1/2020  © 3032-7500 [employer]. All rights reserved.  This information is not intended as a substitute f results found for this or any previous visit.  Limited to patients who meet one of the following criteria:   • Men who are 73-68 years old and have smoked more than 100 cigarettes in their lifetime   • Anyone with a family history    Colorectal Cancer Scree preventive care reminders to display for this patient. Please get this Mammogram regularly   Immunizations      Influenza  Covered Annually No orders found for this or any previous visit.  Please get every year    Pneumococcal 13 (Prevnar)  Covered Once aft regarding Advance Directives.

## 2021-02-22 NOTE — PROGRESS NOTES
HPI:   Charisma Sherman is a 80year old female who presents for a Medicare Subsequent Annual Wellness visit (Pt already had Initial Annual Wellness).     Ms. Natalya Kingsley a pleasant 49-year-old female with history of hypertension, hyperlipidemia, arthritis, n Health Care on file in [place]. Not Discussed       She has never smoked tobacco.    CAGE Alcohol screening   Reinaldo Ma was screened for Alcohol abuse and had a score of 0 so is at low risk.     Patient Care Team: Patient Care Team:  RIAZ Gurrola daily.       MEDICAL INFORMATION:   She  has a past medical history of Arthritis, Essential hypertension, and Hyperlipidemia. She  has a past surgical history that includes cholecystectomy.     Her family history includes Cancer in her brother and sister Administered   • FLU VAC High Dose 65 YRS & Older PRSV Free (77244) 10/01/2019   • Fluzone Vaccine Medicare () 09/22/2015, 09/09/2016, 10/26/2017   • Influenza 09/18/2014   • Pneumococcal (Prevnar 13) 09/24/2010   • Pneumovax 23 01/26/2017   Pended Da Annually No results found for: FOB No flowsheet data found. Glaucoma Screening      Ophthalmology Visit Annually: Diabetics, FHx Glaucoma, AA>50, > 65 No flowsheet data found.     Bone Density Screening      Dexascan Every two years No results fo Monitoring of Persistent     Medications (ACE/ARB, digoxin diuretics, anticonvulsants.)    Potassium  Annually Potassium (mmol/L)   Date Value   02/18/2020 4.8    No flowsheet data found.     Creatinine  Annually Creatinine (mg/dL)   Date Value   02/18/2020

## 2021-03-11 RX ORDER — HYDROCODONE BITARTRATE AND ACETAMINOPHEN 7.5; 325 MG/1; MG/1
1 TABLET ORAL EVERY 8 HOURS PRN
Qty: 60 TABLET | Refills: 0 | Status: SHIPPED | OUTPATIENT
Start: 2021-03-11 | End: 2021-04-09

## 2021-03-11 NOTE — TELEPHONE ENCOUNTER
Requesting Norco 7.5/325mg  LOV: 2/22/21  RTC: 6 months  Last Relevant Labs: 2/22/21  Filled: 2/8/21 #60 with 0 refills    No future appointments.     Per [place] , last dispensed 2/10/21 #60    Rx pended and routed for approval/denial

## 2021-04-09 RX ORDER — HYDROCODONE BITARTRATE AND ACETAMINOPHEN 7.5; 325 MG/1; MG/1
1 TABLET ORAL EVERY 8 HOURS PRN
Qty: 60 TABLET | Refills: 0 | Status: SHIPPED | OUTPATIENT
Start: 2021-04-09 | End: 2021-05-05

## 2021-04-09 NOTE — TELEPHONE ENCOUNTER
Disp Refills Start End    HYDROcodone-acetaminophen (NORCO) 7.5-325 MG Oral Tab 60 tablet 0 3/11/2021 4/10/2021      Last OV 2/22/21  No future appointments. Please advise.  Thank you

## 2021-04-12 RX ORDER — SIMVASTATIN 40 MG
40 TABLET ORAL EVERY EVENING
Qty: 90 TABLET | Refills: 1 | Status: SHIPPED | OUTPATIENT
Start: 2021-04-12 | End: 2021-10-11

## 2021-04-12 NOTE — TELEPHONE ENCOUNTER
Medication Quantity Refills Start End   SIMVASTATIN 40 MG Oral Tab 90 tablet 0 12/18/2020      Last OV 2/22/21   Last lipid panel 2/22/21  No future appointments.   Cholesterol Medication Protocol Sawtrk4304/12/2021 10:50 AM   ALT < 80 Protocol Details    ALT

## 2021-04-12 NOTE — TELEPHONE ENCOUNTER
SIMVASTATIN 40 MG Oral Tab    [facility] #[id number] - [address] AT [facility] OF [address] [facility], [phone], [phone]

## 2021-05-05 RX ORDER — HYDROCODONE BITARTRATE AND ACETAMINOPHEN 7.5; 325 MG/1; MG/1
1 TABLET ORAL EVERY 8 HOURS PRN
Qty: 60 TABLET | Refills: 0 | Status: SHIPPED | OUTPATIENT
Start: 2021-05-05 | End: 2021-06-08

## 2021-05-05 NOTE — TELEPHONE ENCOUNTER
Medication Quantity Refills Start End   HYDROcodone-acetaminophen (NORCO) 7.5-325 MG Oral Tab 60 tablet 0 4/9/2021 5/9/2021   Sig:   Take 1 tablet by mouth every 8 (eight) hours as needed for Pain.        HYDROcodone-Acetaminophen   Dispensed Written Dilia Mejia

## 2021-05-10 RX ORDER — GABAPENTIN 300 MG/1
CAPSULE ORAL
Qty: 270 CAPSULE | Refills: 1 | Status: SHIPPED | OUTPATIENT
Start: 2021-05-10 | End: 2021-12-06

## 2021-05-10 NOTE — TELEPHONE ENCOUNTER
Requesting Gabapentin 300mg  LOV: 2/22/21 Physical  RTC: 6 months  Last Relevant Labs: 2/22/21  Filled: 10/5/2020 #270 with 1 refills    No future appointments.     Rx pended and routed for approval/denial

## 2021-06-08 RX ORDER — HYDROCODONE BITARTRATE AND ACETAMINOPHEN 7.5; 325 MG/1; MG/1
1 TABLET ORAL EVERY 8 HOURS PRN
Qty: 60 TABLET | Refills: 0 | Status: SHIPPED | OUTPATIENT
Start: 2021-06-08 | End: 2021-07-06

## 2021-06-08 NOTE — TELEPHONE ENCOUNTER
Medication Quantity Refills Start End   HYDROcodone-acetaminophen (NORCO) 7.5-325 MG Oral Tab () 60 tablet 0 2021     Last OV 21   No future appointments. Please advise.  Thank you

## 2021-07-06 RX ORDER — HYDROCODONE BITARTRATE AND ACETAMINOPHEN 7.5; 325 MG/1; MG/1
1 TABLET ORAL EVERY 8 HOURS PRN
Qty: 60 TABLET | Refills: 0 | Status: SHIPPED | OUTPATIENT
Start: 2021-07-08 | End: 2021-08-07

## 2021-07-06 NOTE — TELEPHONE ENCOUNTER
Requesting Norco 7.5/325mg  LOV: 2/22/21  RTC: 6 months  Last Relevant Labs: 2/22/21  Filled: 6/8/21 #60 with 0 refills    No future appointments.     Rx pended and routed for approval/denial

## 2021-07-17 ENCOUNTER — TELEPHONE (OUTPATIENT)
Dept: FAMILY MEDICINE CLINIC | Facility: CLINIC | Age: 86
End: 2021-07-17

## 2021-07-17 NOTE — TELEPHONE ENCOUNTER
Spoke with patient’s nephew Moi Ames at 10:45 am. Lise Graham hurt her leg 4 days ago when vacuum cleaning. The leg is getting more swollen and is draining pus. Unable to weight bear on the leg even with her walker.  He can not get her out of the house and was requ

## 2021-07-19 ENCOUNTER — TELEPHONE (OUTPATIENT)
Dept: FAMILY MEDICINE CLINIC | Facility: CLINIC | Age: 86
End: 2021-07-19

## 2021-07-20 NOTE — TELEPHONE ENCOUNTER
Spoke with Angie Wells regarding his request for a hospital bed for pt, Angie Wells states he would also like to request a \"small electric wheelchair\" because [facility] qualifies for one with Medicare. \" Angie Wells states pt is \"regressing little by little\" and he needs to get t

## 2021-07-28 DIAGNOSIS — I10 ESSENTIAL HYPERTENSION: Primary | ICD-10-CM

## 2021-07-28 RX ORDER — LOSARTAN POTASSIUM 100 MG/1
TABLET ORAL
Qty: 90 TABLET | Refills: 1 | Status: SHIPPED | OUTPATIENT
Start: 2021-07-28 | End: 2021-10-27

## 2021-07-28 NOTE — TELEPHONE ENCOUNTER
Name from pharmacy: LOSARTAN 100MG TABLETS          Will file in chart as: LOSARTAN 100 MG Oral Tab    Sig: TAKE 1 TABLET(100 MG) BY MOUTH DAILY    Disp:  90 tablet    Refills:  1 (Pharmacy requested: Not specified)    Start: 7/28/2021    Class: Normal

## 2021-08-02 RX ORDER — CYCLOBENZAPRINE HCL 5 MG
TABLET ORAL
Qty: 60 TABLET | Refills: 5 | Status: SHIPPED | OUTPATIENT
Start: 2021-08-02 | End: 2021-10-27

## 2021-08-02 NOTE — TELEPHONE ENCOUNTER
Requested Prescriptions     Pending Prescriptions Disp Refills   • CYCLOBENZAPRINE 5 MG Oral Tab [Pharmacy Med Name: CYCLOBENZAPRINE 5MG TABLETS] 60 tablet 5     Sig: TAKE 1 TABLET BY MOUTH ONCE TO TWICE DAILY     LOV: 2/22/21  RTC: 6 months  Last Relevant

## 2021-08-09 RX ORDER — HYDROCODONE BITARTRATE AND ACETAMINOPHEN 7.5; 325 MG/1; MG/1
1 TABLET ORAL EVERY 8 HOURS PRN
Qty: 60 TABLET | Refills: 0 | Status: SHIPPED | OUTPATIENT
Start: 2021-08-09 | End: 2021-08-31

## 2021-08-17 ENCOUNTER — OFFICE VISIT (OUTPATIENT)
Dept: FAMILY MEDICINE CLINIC | Facility: CLINIC | Age: 86
End: 2021-08-17
Payer: MEDICARE

## 2021-08-17 VITALS
SYSTOLIC BLOOD PRESSURE: 112 MMHG | HEART RATE: 94 BPM | DIASTOLIC BLOOD PRESSURE: 62 MMHG | HEIGHT: 65 IN | BODY MASS INDEX: 19.99 KG/M2 | RESPIRATION RATE: 14 BRPM | OXYGEN SATURATION: 98 % | TEMPERATURE: 98 F | WEIGHT: 120 LBS

## 2021-08-17 DIAGNOSIS — G62.9 NEUROPATHY: ICD-10-CM

## 2021-08-17 DIAGNOSIS — R53.81 PHYSICAL DECONDITIONING: Primary | ICD-10-CM

## 2021-08-17 DIAGNOSIS — I10 ESSENTIAL HYPERTENSION: ICD-10-CM

## 2021-08-17 DIAGNOSIS — M19.90 ARTHRITIS: ICD-10-CM

## 2021-08-17 PROCEDURE — 99214 OFFICE O/P EST MOD 30 MIN: CPT | Performed by: FAMILY MEDICINE

## 2021-08-17 NOTE — PROGRESS NOTES
HPI/Subjective:   Patient ID: Ruben Ferrari is a 80year old female. HPI     Ms. Morton is a pleasant 41-year-old female with history of hypertension, hyperlipidemia, arthritis, neuropathy who is wheelchair-bound and accompanied by his nephew, Hershell Kenrick. mouth daily. Allergies:No Known Allergies    Objective:   Physical Exam  Constitutional: No distress. HENT:   Mouth/Throat: Oropharynx is clear and moist.   Eyes: Conjunctivae are normal. No scleral icterus.    Neck: Neck supple. No thyromegaly pres

## 2021-08-17 NOTE — PATIENT INSTRUCTIONS
Thank you for choosing Vivek Goode MD at [place] [address]  To Do: [place]  1. Please take meds as directed. [facility] is located in [address]. Monday, Tuesday & Friday – 8 a.m. to 4 p.m.   Wednesday, Thursday – 7 a.m. to 3 p.m those potential risks and we strive to make you healthier and to improve your quality of life.     Referrals, and Radiology Information:    If your insurance requires a referral to a specialist, please allow 5 business days to process your referral request. The bones rub against each other, causing pain and swelling. Over time, small pieces of rough or splintered bone (bone spurs) may develop. The joint's range of motion can become limited.    Symptoms  Some of the more common symptoms of arthritis include:

## 2021-08-31 RX ORDER — HYDROCODONE BITARTRATE AND ACETAMINOPHEN 7.5; 325 MG/1; MG/1
1 TABLET ORAL EVERY 8 HOURS PRN
Qty: 60 TABLET | Refills: 0 | Status: SHIPPED | OUTPATIENT
Start: 2021-08-31 | End: 2021-09-28

## 2021-08-31 NOTE — TELEPHONE ENCOUNTER
Requesting Norco 7.5/325mg  LOV: 8/17/21  RTC: prn  Last Relevant Labs: 2/22/21  Filled: 8/9/21 #60 with 0 refills    Future Appointments   Date Time Provider Leno Quezada   2/17/2022  1:30 PM Dearl Simmonds, MD [facility] [address] [facility]     [place]

## 2021-09-28 RX ORDER — HYDROCODONE BITARTRATE AND ACETAMINOPHEN 7.5; 325 MG/1; MG/1
1 TABLET ORAL EVERY 8 HOURS PRN
Qty: 60 TABLET | Refills: 0 | Status: SHIPPED | OUTPATIENT
Start: 2021-09-28 | End: 2021-10-27

## 2021-09-28 NOTE — TELEPHONE ENCOUNTER
- Pt is requesting a refill for medication. Pharmacy would not request the medication.     [facility] [address] [facility], [address] AT [address] At [facility], [phone], [phone]       Outpatient Medi

## 2021-09-28 NOTE — TELEPHONE ENCOUNTER
Medication Quantity Refills Start End   HYDROcodone-acetaminophen (NORCO) 7.5-325 MG Oral Tab 60 tablet 0 8/31/2021    Sig:   Take 1 tablet by mouth every 8 (eight) hours as needed for Pain.      Route:   Oral       Last OV 8/17/21  Future Appointments   Da

## 2021-10-11 RX ORDER — SIMVASTATIN 40 MG
TABLET ORAL
Qty: 90 TABLET | Refills: 1 | Status: SHIPPED | OUTPATIENT
Start: 2021-10-11 | End: 2022-01-21

## 2021-10-27 DIAGNOSIS — I10 ESSENTIAL HYPERTENSION: ICD-10-CM

## 2021-10-27 RX ORDER — HYDROCODONE BITARTRATE AND ACETAMINOPHEN 7.5; 325 MG/1; MG/1
1 TABLET ORAL EVERY 8 HOURS PRN
Qty: 60 TABLET | Refills: 0 | Status: SHIPPED | OUTPATIENT
Start: 2021-10-27 | End: 2021-11-29

## 2021-10-27 RX ORDER — CYCLOBENZAPRINE HCL 5 MG
5 TABLET ORAL 2 TIMES DAILY
Qty: 60 TABLET | Refills: 5 | Status: SHIPPED | OUTPATIENT
Start: 2021-10-27 | End: 2022-02-04

## 2021-10-27 RX ORDER — LOSARTAN POTASSIUM 100 MG/1
100 TABLET ORAL DAILY
Qty: 90 TABLET | Refills: 1 | Status: SHIPPED | OUTPATIENT
Start: 2021-10-27

## 2021-10-27 NOTE — TELEPHONE ENCOUNTER
Patient's nephew requesting refills for the following: Please advise  Losartan  Norco  Cyclobenzaprine

## 2021-11-29 RX ORDER — HYDROCODONE BITARTRATE AND ACETAMINOPHEN 7.5; 325 MG/1; MG/1
1 TABLET ORAL EVERY 8 HOURS PRN
Qty: 60 TABLET | Refills: 0 | Status: SHIPPED | OUTPATIENT
Start: 2021-11-29 | End: 2021-12-27

## 2021-11-29 NOTE — TELEPHONE ENCOUNTER
- Pt is requesting refill for medication.     [facility]ORE [address] [facility], [address] AT [facility] OF [facility], [phone], [phone]       Additional Information    Associated Reports   View Encounter

## 2021-11-29 NOTE — TELEPHONE ENCOUNTER
Medication Quantity Refills Start End   HYDROcodone-acetaminophen (NORCO) 7.5-325 MG Oral Tab 60 tablet 0 10/27/2021    Sig:   Take 1 tablet by mouth every 8 (eight) hours as needed for Pain.      Route:   Oral       Last OV 8/17/21   Future Appointments

## 2021-12-06 RX ORDER — GABAPENTIN 300 MG/1
CAPSULE ORAL
Qty: 270 CAPSULE | Refills: 1 | Status: SHIPPED | OUTPATIENT
Start: 2021-12-06

## 2021-12-27 RX ORDER — HYDROCODONE BITARTRATE AND ACETAMINOPHEN 7.5; 325 MG/1; MG/1
1 TABLET ORAL EVERY 8 HOURS PRN
Qty: 60 TABLET | Refills: 0 | Status: SHIPPED | OUTPATIENT
Start: 2021-12-27

## 2021-12-27 NOTE — TELEPHONE ENCOUNTER
See TE, per [place]- last filled 11/29/21 for a 20 day supply.  Rx pended for your review and approval/denial.

## 2021-12-27 NOTE — TELEPHONE ENCOUNTER
HYDROcodone-acetaminophen (NORCO) 7.5-325 MG Oral Tab    [facility] #[id number] - [address] AT [address] [facility], [phone], [phone]  ___________________________________________    Keisha Barth states this

## 2022-01-21 RX ORDER — SIMVASTATIN 40 MG
TABLET ORAL
Qty: 90 TABLET | Refills: 0 | Status: SHIPPED | OUTPATIENT
Start: 2022-01-21

## 2022-01-21 NOTE — TELEPHONE ENCOUNTER
Medication Quantity Refills Start End   SIMVASTATIN 40 MG Oral Tab 90 tablet 1 10/11/2021    Sig: Ardelle Hint 1 TABLET(40 MG) BY MOUTH EVERY EVENING        Cholesterol Medication Protocol Passed 01/20/2022 07:54 PM   Protocol Details  ALT < 80    ALT resulted w

## 2022-01-28 NOTE — TELEPHONE ENCOUNTER
Requesting Norco 7.5/325mg  LOV: 8/17/21  RTC: 6 months  Last Relevant Labs: 2/22/21  Filled: 12/27/21 #60 with 0 refills    Future Appointments   Date Time Provider Leno Quezada   2/17/2022  1:30 PM Bryon Potts MD [facility] [address] [facility]     Per [place]

## 2022-01-30 RX ORDER — HYDROCODONE BITARTRATE AND ACETAMINOPHEN 7.5; 325 MG/1; MG/1
1 TABLET ORAL EVERY 8 HOURS PRN
Qty: 60 TABLET | Refills: 0 | Status: SHIPPED | OUTPATIENT
Start: 2022-01-30

## 2022-02-04 RX ORDER — CYCLOBENZAPRINE HCL 5 MG
TABLET ORAL
Qty: 60 TABLET | Refills: 5 | Status: SHIPPED | OUTPATIENT
Start: 2022-02-04

## 2022-02-17 ENCOUNTER — TELEMEDICINE (OUTPATIENT)
Dept: FAMILY MEDICINE CLINIC | Facility: CLINIC | Age: 87
End: 2022-02-17
Payer: MEDICARE

## 2022-02-17 DIAGNOSIS — K59.00 CONSTIPATION, UNSPECIFIED CONSTIPATION TYPE: Primary | ICD-10-CM

## 2022-02-17 PROCEDURE — 99214 OFFICE O/P EST MOD 30 MIN: CPT | Performed by: FAMILY MEDICINE

## 2022-02-17 RX ORDER — DOCUSATE SODIUM 100 MG/1
100 CAPSULE, LIQUID FILLED ORAL 2 TIMES DAILY PRN
Qty: 60 CAPSULE | Refills: 1 | Status: SHIPPED | OUTPATIENT
Start: 2022-02-17

## 2022-02-23 RX ORDER — HYDROCODONE BITARTRATE AND ACETAMINOPHEN 7.5; 325 MG/1; MG/1
1 TABLET ORAL EVERY 8 HOURS PRN
Qty: 60 TABLET | Refills: 0 | Status: SHIPPED | OUTPATIENT
Start: 2022-02-23 | End: 2022-03-25

## 2022-03-25 RX ORDER — HYDROCODONE BITARTRATE AND ACETAMINOPHEN 7.5; 325 MG/1; MG/1
1 TABLET ORAL EVERY 8 HOURS PRN
Qty: 60 TABLET | Refills: 0 | Status: SHIPPED | OUTPATIENT
Start: 2022-03-25

## 2022-03-25 NOTE — TELEPHONE ENCOUNTER
HYDROcodone-acetaminophen (NORCO) 7.5-325 MG Oral Tab    [facility] STORE [address] [facility] AT [facility] OF [address] [facility], [phone], [phone]

## 2022-03-25 NOTE — TELEPHONE ENCOUNTER
Requesting Norco 7.5/325mg  LOV: 2/17/22  RTC: prn  Last Relevant Labs: 2/22/21  Filled: 2/23/22 #60 with 0 refills    No future appointments.     Rx pended and routed for approval/denial

## 2022-04-06 RX ORDER — DOCUSATE SODIUM 100 MG/1
CAPSULE, LIQUID FILLED ORAL
Qty: 60 CAPSULE | Refills: 1 | Status: SHIPPED | OUTPATIENT
Start: 2022-04-06

## 2022-04-06 RX ORDER — LOSARTAN POTASSIUM 100 MG/1
TABLET ORAL
Qty: 90 TABLET | Refills: 1 | Status: SHIPPED | OUTPATIENT
Start: 2022-04-06

## 2022-04-19 ENCOUNTER — TELEMEDICINE (OUTPATIENT)
Dept: FAMILY MEDICINE CLINIC | Facility: CLINIC | Age: 87
End: 2022-04-19
Payer: MEDICARE

## 2022-04-19 ENCOUNTER — TELEPHONE (OUTPATIENT)
Dept: FAMILY MEDICINE CLINIC | Facility: CLINIC | Age: 87
End: 2022-04-19

## 2022-04-19 DIAGNOSIS — S81.811D LEG LACERATION, RIGHT, SUBSEQUENT ENCOUNTER: Primary | ICD-10-CM

## 2022-04-19 PROCEDURE — 99214 OFFICE O/P EST MOD 30 MIN: CPT | Performed by: FAMILY MEDICINE
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
## 2022-04-25 RX ORDER — HYDROCODONE BITARTRATE AND ACETAMINOPHEN 7.5; 325 MG/1; MG/1
1 TABLET ORAL EVERY 8 HOURS PRN
Qty: 60 TABLET | Refills: 0 | Status: SHIPPED | OUTPATIENT
Start: 2022-04-25

## 2022-04-25 NOTE — TELEPHONE ENCOUNTER
Requesting Norco 7.5/325mg  LOV: 4/19/22 VV  RTC: prn  Last Relevant Labs: 2/22/21  Filled: 3/25/22 #60 with 0 refills    No future appointments.     Per [place] , last dispensed 3/25/22 #60    Rx pended and routed for approval/denial

## 2022-05-26 RX ORDER — DOCUSATE SODIUM 100 MG/1
100 CAPSULE, LIQUID FILLED ORAL 2 TIMES DAILY PRN
Qty: 60 CAPSULE | Refills: 1 | Status: SHIPPED | OUTPATIENT
Start: 2022-05-26

## 2022-05-26 RX ORDER — HYDROCODONE BITARTRATE AND ACETAMINOPHEN 7.5; 325 MG/1; MG/1
1 TABLET ORAL EVERY 8 HOURS PRN
Qty: 60 TABLET | Refills: 0 | Status: SHIPPED | OUTPATIENT
Start: 2022-05-26

## 2022-05-26 NOTE — TELEPHONE ENCOUNTER
Pts Nephew is calling to refill the Norco, and constipation meds. He forgot to mention it when he was here for his appointment yesterday. Can  send them in to pharmacy?

## 2022-06-05 RX ORDER — GABAPENTIN 300 MG/1
CAPSULE ORAL
Qty: 270 CAPSULE | Refills: 1 | Status: SHIPPED | OUTPATIENT
Start: 2022-06-05

## 2022-06-23 RX ORDER — HYDROCODONE BITARTRATE AND ACETAMINOPHEN 7.5; 325 MG/1; MG/1
1 TABLET ORAL EVERY 8 HOURS PRN
Qty: 60 TABLET | Refills: 0 | Status: SHIPPED | OUTPATIENT
Start: 2022-06-23

## 2022-06-23 NOTE — TELEPHONE ENCOUNTER
Relative calling, requesting monthly refill on [place]. Patient just realized she is out of medication.  Please send to [facility] on file

## 2022-06-24 ENCOUNTER — TELEPHONE (OUTPATIENT)
Dept: FAMILY MEDICINE CLINIC | Facility: CLINIC | Age: 87
End: 2022-06-24

## 2022-06-24 NOTE — TELEPHONE ENCOUNTER
Spoke with Jannie and advised we will need a faxed request to provide information requested, Johnathan Parra verbalized understanding and took down fax number.

## 2022-06-24 NOTE — TELEPHONE ENCOUNTER
Family is looking to move patient closer to home, looking to have her move to [facility] facility. Facility needs to review clinical information in order to see if patient will be accepted at skilled nursing facility. Requesting face sheet,demograhics and recent office visit notes within the last 6 months.  Any pertinent information can be faxed to [phone]

## 2022-06-29 ENCOUNTER — TELEPHONE (OUTPATIENT)
Dept: FAMILY MEDICINE CLINIC | Facility: CLINIC | Age: 87
End: 2022-06-29

## 2022-06-29 NOTE — TELEPHONE ENCOUNTER
Recd request from [facility], [address] [facility] [place], [address] [facility], [address] with phone [phone] and fax [phone]. This is a long term care facility that patient will be moving into. Requested: Demo sheet, Recent office visit notes, and medication list. Physician signed the last office visit note from 4/19/2022 and the medication list.  Faxed those documents as well as demo page to [phone]. Copy of request sent to scan and copy of request plus records placed in blue accordion folder next to  copy/fax machine.

## 2022-07-05 ENCOUNTER — TELEPHONE (OUTPATIENT)
Dept: FAMILY MEDICINE CLINIC | Facility: CLINIC | Age: 87
End: 2022-07-05

## 2022-07-05 RX ORDER — HYDROCODONE BITARTRATE AND ACETAMINOPHEN 7.5; 325 MG/1; MG/1
1 TABLET ORAL EVERY 8 HOURS PRN
Qty: 60 TABLET | Refills: 0 | Status: SHIPPED | OUTPATIENT
Start: 2022-07-22

## 2022-07-05 NOTE — TELEPHONE ENCOUNTER
Colin Scott) caretaker of patient is requesting postdated printed prescription for pain medication Which was given to him today during his visit with me.

## 2022-07-06 RX ORDER — SIMVASTATIN 40 MG
TABLET ORAL
Qty: 90 TABLET | Refills: 0 | Status: SHIPPED | OUTPATIENT
Start: 2022-07-06

## 2022-07-18 ENCOUNTER — TELEPHONE (OUTPATIENT)
Dept: FAMILY MEDICINE CLINIC | Facility: CLINIC | Age: 87
End: 2022-07-18

## 2022-07-18 RX ORDER — HYDROCODONE BITARTRATE AND ACETAMINOPHEN 7.5; 325 MG/1; MG/1
1 TABLET ORAL EVERY 8 HOURS PRN
Qty: 60 TABLET | Refills: 0 | Status: SHIPPED | OUTPATIENT
Start: 2022-07-22

## 2022-07-18 NOTE — TELEPHONE ENCOUNTER
Pt's daughter called and said he mom's prescription needs to be sent to [facility] in [place], the one listed in her chart. She will run out of meds by 7/22/22.

## 2022-07-18 NOTE — TELEPHONE ENCOUNTER
Please cancel the [place] script sent to [facility].    I erxed a script to [place] for 7/22

## 2022-07-20 NOTE — TELEPHONE ENCOUNTER
Pt needs the Cyclobenzaprine 5mg sent to the new pharmacy in [place] where  sent updated script of Abigail Marcos a few days ago.

## 2022-07-21 RX ORDER — CYCLOBENZAPRINE HCL 5 MG
5 TABLET ORAL 2 TIMES DAILY
Qty: 60 TABLET | Refills: 1 | Status: SHIPPED | OUTPATIENT
Start: 2022-07-21

## 2022-07-29 ENCOUNTER — TELEPHONE (OUTPATIENT)
Dept: FAMILY MEDICINE CLINIC | Facility: CLINIC | Age: 87
End: 2022-07-29

## 2022-07-29 NOTE — TELEPHONE ENCOUNTER
Patient's POA states her nephew came into the office and signed a TIKA for the patient but didn't fill out the recipient information because they didn't know where she was going to be seen yet. Now they have a doctor:   Jake Johnson, [address] [facility], [address], [facility], [address] [facility] [phone] and fax [phone]. Her appt is 8/4/22. Not found in \"work in progress\". Please advise.

## 2022-08-02 NOTE — TELEPHONE ENCOUNTER
Sent [facility] that we did not receive TIKA-can either mail one or she can print records off [address]

## (undated) DIAGNOSIS — I10 ESSENTIAL HYPERTENSION: ICD-10-CM